# Patient Record
Sex: FEMALE | Race: BLACK OR AFRICAN AMERICAN | URBAN - METROPOLITAN AREA
[De-identification: names, ages, dates, MRNs, and addresses within clinical notes are randomized per-mention and may not be internally consistent; named-entity substitution may affect disease eponyms.]

---

## 2021-05-05 ENCOUNTER — OFFICE VISIT (OUTPATIENT)
Dept: URBAN - METROPOLITAN AREA CLINIC 25 | Facility: CLINIC | Age: 66
End: 2021-05-05
Payer: COMMERCIAL

## 2021-05-05 ENCOUNTER — WEB ENCOUNTER (OUTPATIENT)
Dept: URBAN - METROPOLITAN AREA CLINIC 25 | Facility: CLINIC | Age: 66
End: 2021-05-05

## 2021-05-05 DIAGNOSIS — R19.7 DIARRHEA: ICD-10-CM

## 2021-05-05 DIAGNOSIS — Z86.010 PERSONAL HISTORY OF COLON POLYPS: ICD-10-CM

## 2021-05-05 PROCEDURE — 99203 OFFICE O/P NEW LOW 30 MIN: CPT | Performed by: INTERNAL MEDICINE

## 2021-05-05 RX ORDER — SODIUM, POTASSIUM,MAG SULFATES 17.5-3.13G
177 ML SOLUTION, RECONSTITUTED, ORAL ORAL DAILY
Qty: 177 ML | Refills: 0 | OUTPATIENT
Start: 2021-05-05 | End: 2021-05-06

## 2021-05-05 NOTE — HPI-TODAY'S VISIT:
The patient was referred by Dr. Kanwal Kauffman for colonoscopy .   A copy of this document is being forwarded to the referring provider. Overall the patient has been feeling well.  About 2-3 months ago she lost her appetite.  She then lost about 25 pounds.  She was having post prnadial diarrhea.  She has some cramping prior to the BM.  Her weight has stabilized.  Her dymptoms started after se got her second Moderna COVID vaccine.  She had not been on prior antibiotics or steroids.  Prior to 2-3 months ago she was having normal BM's.  She denies hematochezia, BRBPR, or melena.  The patient denies GERD/N/V/dysphagia.  There is no FHx of colon cancer.  The patient has had prior colonoscopy about 5 years ago.  She did have polyps and was told to repeat it in 5 years.

## 2021-05-10 PROBLEM — 428283002 HISTORY OF POLYP OF COLON: Status: ACTIVE | Noted: 2021-05-05

## 2021-05-12 ENCOUNTER — OFFICE VISIT (OUTPATIENT)
Dept: URBAN - METROPOLITAN AREA CLINIC 25 | Facility: CLINIC | Age: 66
End: 2021-05-12

## 2021-05-14 ENCOUNTER — TELEPHONE ENCOUNTER (OUTPATIENT)
Dept: URBAN - METROPOLITAN AREA CLINIC 92 | Facility: CLINIC | Age: 66
End: 2021-05-14

## 2021-05-14 ENCOUNTER — OFFICE VISIT (OUTPATIENT)
Dept: URBAN - METROPOLITAN AREA SURGERY CENTER 20 | Facility: SURGERY CENTER | Age: 66
End: 2021-05-14
Payer: COMMERCIAL

## 2021-05-14 ENCOUNTER — TELEPHONE ENCOUNTER (OUTPATIENT)
Dept: URBAN - METROPOLITAN AREA CLINIC 84 | Facility: CLINIC | Age: 66
End: 2021-05-14

## 2021-05-14 ENCOUNTER — CLAIMS CREATED FROM THE CLAIM WINDOW (OUTPATIENT)
Dept: URBAN - METROPOLITAN AREA CLINIC 4 | Facility: CLINIC | Age: 66
End: 2021-05-14
Payer: COMMERCIAL

## 2021-05-14 DIAGNOSIS — K63.89 STENOSIS OF ILEOCECAL VALVE: ICD-10-CM

## 2021-05-14 DIAGNOSIS — K63.5 BENIGN COLON POLYP: ICD-10-CM

## 2021-05-14 DIAGNOSIS — Z86.010 H/O ADENOMATOUS POLYP OF COLON: ICD-10-CM

## 2021-05-14 DIAGNOSIS — R19.7 ACUTE DIARRHEA: ICD-10-CM

## 2021-05-14 PROCEDURE — 88342 IMHCHEM/IMCYTCHM 1ST ANTB: CPT | Performed by: PATHOLOGY

## 2021-05-14 PROCEDURE — 88305 TISSUE EXAM BY PATHOLOGIST: CPT | Performed by: PATHOLOGY

## 2021-05-14 PROCEDURE — 88313 SPECIAL STAINS GROUP 2: CPT | Performed by: PATHOLOGY

## 2021-05-14 PROCEDURE — 45380 COLONOSCOPY AND BIOPSY: CPT | Performed by: INTERNAL MEDICINE

## 2021-05-14 PROCEDURE — G8907 PT DOC NO EVENTS ON DISCHARG: HCPCS | Performed by: INTERNAL MEDICINE

## 2021-05-26 ENCOUNTER — OFFICE VISIT (OUTPATIENT)
Dept: URBAN - METROPOLITAN AREA SURGERY CENTER 20 | Facility: SURGERY CENTER | Age: 66
End: 2021-05-26
Payer: COMMERCIAL

## 2021-05-26 ENCOUNTER — CLAIMS CREATED FROM THE CLAIM WINDOW (OUTPATIENT)
Dept: URBAN - METROPOLITAN AREA CLINIC 4 | Facility: CLINIC | Age: 66
End: 2021-05-26
Payer: COMMERCIAL

## 2021-05-26 ENCOUNTER — TELEPHONE ENCOUNTER (OUTPATIENT)
Dept: URBAN - METROPOLITAN AREA CLINIC 92 | Facility: CLINIC | Age: 66
End: 2021-05-26

## 2021-05-26 DIAGNOSIS — K31.84 DIABETIC GASTROPARESIS: ICD-10-CM

## 2021-05-26 DIAGNOSIS — K29.40 CHRONIC ATROPHIC GASTRITIS WITHOUT BLEEDING: ICD-10-CM

## 2021-05-26 DIAGNOSIS — K31.89 SMALL STOMACH SYNDROME: ICD-10-CM

## 2021-05-26 DIAGNOSIS — K31.89 ACQUIRED DEFORMITY OF DUODENUM: ICD-10-CM

## 2021-05-26 DIAGNOSIS — K29.60 ADENOPAPILLOMATOSIS GASTRICA: ICD-10-CM

## 2021-05-26 PROCEDURE — 43239 EGD BIOPSY SINGLE/MULTIPLE: CPT | Performed by: INTERNAL MEDICINE

## 2021-05-26 PROCEDURE — G8907 PT DOC NO EVENTS ON DISCHARG: HCPCS | Performed by: INTERNAL MEDICINE

## 2021-05-26 PROCEDURE — 88342 IMHCHEM/IMCYTCHM 1ST ANTB: CPT | Performed by: PATHOLOGY

## 2021-05-26 PROCEDURE — 88305 TISSUE EXAM BY PATHOLOGIST: CPT | Performed by: PATHOLOGY

## 2021-06-07 ENCOUNTER — LAB OUTSIDE AN ENCOUNTER (OUTPATIENT)
Dept: URBAN - METROPOLITAN AREA CLINIC 84 | Facility: CLINIC | Age: 66
End: 2021-06-07

## 2021-06-07 LAB
CREATININE POC: 1
PERFORMING LAB: (no result)

## 2021-06-30 ENCOUNTER — OFFICE VISIT (OUTPATIENT)
Dept: URBAN - METROPOLITAN AREA CLINIC 25 | Facility: CLINIC | Age: 66
End: 2021-06-30

## 2022-12-06 ENCOUNTER — OUT OF OFFICE VISIT (OUTPATIENT)
Dept: URBAN - METROPOLITAN AREA MEDICAL CENTER 39 | Facility: MEDICAL CENTER | Age: 67
End: 2022-12-06
Payer: COMMERCIAL

## 2022-12-06 DIAGNOSIS — K92.1 ACUTE MELENA: ICD-10-CM

## 2022-12-06 DIAGNOSIS — R93.3 ABN FINDINGS-GI TRACT: ICD-10-CM

## 2022-12-06 DIAGNOSIS — R63.4 ABNORMAL INTENTIONAL WEIGHT LOSS: ICD-10-CM

## 2022-12-06 DIAGNOSIS — I85.10 ESOPH VARICE OTHER DIS: ICD-10-CM

## 2022-12-06 DIAGNOSIS — K31.89 ACQUIRED DEFORMITY OF DUODENUM: ICD-10-CM

## 2022-12-06 DIAGNOSIS — K74.60 ADVANCED CIRRHOSIS: ICD-10-CM

## 2022-12-06 DIAGNOSIS — I85.01 BLEEDING ESOPHAGEAL VARICES: ICD-10-CM

## 2022-12-06 DIAGNOSIS — K92.0 BLOODY EMESIS: ICD-10-CM

## 2022-12-06 DIAGNOSIS — R10.13 ABDOMINAL DISCOMFORT, EPIGASTRIC: ICD-10-CM

## 2022-12-06 PROCEDURE — 99223 1ST HOSP IP/OBS HIGH 75: CPT | Performed by: INTERNAL MEDICINE

## 2022-12-06 PROCEDURE — 99233 SBSQ HOSP IP/OBS HIGH 50: CPT | Performed by: INTERNAL MEDICINE

## 2022-12-06 PROCEDURE — 43244 EGD VARICES LIGATION: CPT | Performed by: INTERNAL MEDICINE

## 2022-12-06 PROCEDURE — G8427 DOCREV CUR MEDS BY ELIG CLIN: HCPCS | Performed by: INTERNAL MEDICINE

## 2022-12-10 ENCOUNTER — P2P PATIENT RECORD (OUTPATIENT)
Age: 67
End: 2022-12-10

## 2022-12-10 ENCOUNTER — OUT OF OFFICE VISIT (OUTPATIENT)
Dept: URBAN - METROPOLITAN AREA MEDICAL CENTER 39 | Facility: MEDICAL CENTER | Age: 67
End: 2022-12-10
Payer: COMMERCIAL

## 2022-12-10 DIAGNOSIS — K92.0 BLOODY EMESIS: ICD-10-CM

## 2022-12-10 DIAGNOSIS — R18.8 ABDOMINAL ASCITES: ICD-10-CM

## 2022-12-10 DIAGNOSIS — K74.60 ADVANCED CIRRHOSIS: ICD-10-CM

## 2022-12-10 PROCEDURE — 99232 SBSQ HOSP IP/OBS MODERATE 35: CPT | Performed by: INTERNAL MEDICINE

## 2022-12-12 ENCOUNTER — TELEPHONE ENCOUNTER (OUTPATIENT)
Dept: URBAN - METROPOLITAN AREA CLINIC 98 | Facility: CLINIC | Age: 67
End: 2022-12-12

## 2022-12-22 ENCOUNTER — OFFICE VISIT (OUTPATIENT)
Dept: URBAN - METROPOLITAN AREA CLINIC 98 | Facility: CLINIC | Age: 67
End: 2022-12-22
Payer: COMMERCIAL

## 2022-12-22 ENCOUNTER — LAB OUTSIDE AN ENCOUNTER (OUTPATIENT)
Dept: URBAN - METROPOLITAN AREA CLINIC 98 | Facility: CLINIC | Age: 67
End: 2022-12-22

## 2022-12-22 VITALS
HEART RATE: 76 BPM | WEIGHT: 179.2 LBS | HEIGHT: 67 IN | DIASTOLIC BLOOD PRESSURE: 94 MMHG | SYSTOLIC BLOOD PRESSURE: 169 MMHG | TEMPERATURE: 97.3 F | BODY MASS INDEX: 28.12 KG/M2

## 2022-12-22 DIAGNOSIS — I85.11 SECONDARY ESOPHAGEAL VARICES WITH BLEEDING: ICD-10-CM

## 2022-12-22 DIAGNOSIS — K74.69 OTHER CIRRHOSIS OF LIVER: ICD-10-CM

## 2022-12-22 PROCEDURE — 99214 OFFICE O/P EST MOD 30 MIN: CPT | Performed by: INTERNAL MEDICINE

## 2022-12-22 RX ORDER — ONDANSETRON 4 MG/1
1-2 TABLET ON THE TONGUE AND ALLOW TO DISSOLVE TABLET, ORALLY DISINTEGRATING ORAL
Qty: 60 | Refills: 1 | OUTPATIENT

## 2022-12-22 NOTE — HPI-TODAY'S VISIT:
Here to follow up , after she was admitted to [facility] in Dec for GI bleed and hematemesis, Hb 8.3  Seen by Dr Joshi at [facility] This is my first visit  w her; Jonna (in person) and Aaliyah (pharmacist on phone) daughters here  . no problems with liver in past.  Has DM.   no FH with liver disease  has tramadol for chronic back pain  woke up this morning - throwing up  - they think had nausea due to taking too much tramadol.  is on pantoprazole but she and her daughters are unable to perform a full medication reconciliation, showing some photos on their phone of prescription bottles, and other medications were reconciled verbally .   - s/p EGD on 12/6 w/ Large EV spurting blood w/ red prabhu signs s/p banding x2 w/ complete eradication, gastritis, and no biopsies done.    - MELD 10 on admission w/ Na 144, INR 1.33, bilirubin 0.4, cr 0.89  - Albumin 3.2 - chronic hep studies negative but pt does not hep B vaccination outpatient w/ PCP    - US para on 12/7 w/ no significant free fluid.

## 2022-12-22 NOTE — HPI-OTHER HISTORIES
EXAM: MR ABDOMEN W AND WO CONTRAST MRCP   CLINICAL INDICATION: cirrhosis?.   TECHNIQUE: Multisequence, multiplanar MRI of the abdomen was performed without and with intravenous contrast. [id number].[id number]   COMPARISON: None   FINDINGS:  Lower Thorax: Clear lung base.   Liver: Hepatic cirrhosis. No suspicious focal hepatic lesion.   Gallbladder/Biliary Tree: Gallbladder not visualized. Mild prominence of intrahepatic bile duct in hepatic dome (20:20).   Spleen: No splenomegaly.   Pancreas: No main ductal dilatation.   Adrenal Glands: No adrenal nodules.   Kidneys/Ureters: No hydronephrosis. Bilateral renal cysts.   Gastrointestinal: Nonobstructive bowel loops.    Lymph Nodes: No suspicious lymph nodes by size criteria.   Vessels: Patent vessels. Mild upper abdominal metastasis.   Peritoneum/Retroperitoneum: Small to moderate volume ascites.   Bones/Soft Tissues: Multilevel degenerative changes in the spine. No suspicious osseous lesion. Unremarkable soft tissues.   IMPRESSION: 1.  Hepatic cirrhosis with manifestations of portal hypertension including small to moderate volume ascites and upper abdominal varices.  2.  No suspicious focal hepatic lesion. Patent hepatic vessels. 3.  Mild prominence of intrahepatic bile duct in the hepatic dome.  12/10/22 11:06 White Blood Cell: 4.1 Red Blood Cell: 3.06 (L) Hemoglobin: 8.9 (L) Hematocrit: 27.5 (L) Mean Corpuscular Volume: 89.9 Mean Corpuscular Hemoglobin: 29.1 Mean Corpuscular Hemoglobin Concentration: 32.4 Platelet: 101 (L)  12/8/22 10:58 Sodium: 146 (H) Potassium: 3.6 Chloride: 118 (H) Carbon Dioxide: 23 ANION GAP: 5 Calculated Osmolality: 297 (H) Blood Urea Nitrogen: 27 (H) Creatinine: 1.10 U:C: 25 (H) Estimated GFR: 52 (L) Glucose: 104 Alkaline Phosphatase: 52 Aspartate Aminotransferase: 20 Alanine Aminotransferase: 16 Calcium Level Total: 7.9 (L) Protein Total: 5.1 (L) Albumin: 2.5 (L) Bilirubin Total: 0.5  . fenofibrate  atorvastatin  nadolol  dodex  freestyle lipre

## 2022-12-23 LAB
A/G RATIO: 1.1
ALBUMIN: 3.3
ALKALINE PHOSPHATASE: 88
ALT (SGPT): 18
AST (SGOT): 35
BASO (ABSOLUTE): 0
BASOS: 1
BILIRUBIN, TOTAL: 0.3
BUN/CREATININE RATIO: 13
BUN: 10
CALCIUM: 8.7
CARBON DIOXIDE, TOTAL: 23
CHLORIDE: 110
CREATININE: 0.77
EGFR: 85
EOS (ABSOLUTE): 0.1
EOS: 2
GLOBULIN, TOTAL: 2.9
GLUCOSE: 102
HEMATOCRIT: 28.8
HEMATOLOGY COMMENTS:: (no result)
HEMOGLOBIN: 9.3
IMMATURE CELLS: (no result)
IMMATURE GRANS (ABS): 0
IMMATURE GRANULOCYTES: 0
INR: 1.1
LYMPHS (ABSOLUTE): 0.6
LYMPHS: 18
MCH: 27.8
MCHC: 32.3
MCV: 86
MONOCYTES(ABSOLUTE): 0.3
MONOCYTES: 10
NEUTROPHILS (ABSOLUTE): 2.5
NEUTROPHILS: 69
NRBC: (no result)
PLATELETS: 137
POTASSIUM: 4.7
PROTEIN, TOTAL: 6.2
PROTHROMBIN TIME: 11.5
RBC: 3.35
RDW: 14.1
SODIUM: 143
WBC: 3.5

## 2022-12-26 ENCOUNTER — TELEPHONE ENCOUNTER (OUTPATIENT)
Dept: URBAN - METROPOLITAN AREA CLINIC 98 | Facility: CLINIC | Age: 67
End: 2022-12-26

## 2022-12-28 ENCOUNTER — TELEPHONE ENCOUNTER (OUTPATIENT)
Dept: URBAN - METROPOLITAN AREA CLINIC 98 | Facility: CLINIC | Age: 67
End: 2022-12-28

## 2023-01-16 PROBLEM — 197476001: Status: ACTIVE | Noted: 2021-05-14

## 2023-01-19 ENCOUNTER — TELEPHONE ENCOUNTER (OUTPATIENT)
Dept: URBAN - METROPOLITAN AREA CLINIC 63 | Facility: CLINIC | Age: 68
End: 2023-01-19

## 2023-02-01 ENCOUNTER — OFFICE VISIT (OUTPATIENT)
Dept: URBAN - METROPOLITAN AREA MEDICAL CENTER 39 | Facility: MEDICAL CENTER | Age: 68
End: 2023-02-01
Payer: COMMERCIAL

## 2023-02-01 DIAGNOSIS — K74.69 CIRRHOSIS, CRYPTOGENIC: ICD-10-CM

## 2023-02-01 DIAGNOSIS — K29.60 ADENOPAPILLOMATOSIS GASTRICA: ICD-10-CM

## 2023-02-01 DIAGNOSIS — I85.10 ESOPH VARICE OTHER DIS: ICD-10-CM

## 2023-02-01 DIAGNOSIS — K31.A15 GASTRIC INTESTINAL METAPLASIA WITHOUT DYSPLASIA, INVOLVING MULTIPLE SITES: ICD-10-CM

## 2023-02-01 PROCEDURE — 43244 EGD VARICES LIGATION: CPT | Performed by: INTERNAL MEDICINE

## 2023-02-01 PROCEDURE — 43239 EGD BIOPSY SINGLE/MULTIPLE: CPT | Performed by: INTERNAL MEDICINE

## 2023-02-01 RX ORDER — ONDANSETRON 4 MG/1
1-2 TABLET ON THE TONGUE AND ALLOW TO DISSOLVE TABLET, ORALLY DISINTEGRATING ORAL
Qty: 60 | Refills: 1 | Status: ACTIVE | COMMUNITY

## 2023-02-08 ENCOUNTER — OFFICE VISIT (OUTPATIENT)
Dept: URBAN - METROPOLITAN AREA SURGERY CENTER 18 | Facility: SURGERY CENTER | Age: 68
End: 2023-02-08

## 2023-02-28 ENCOUNTER — TELEPHONE ENCOUNTER (OUTPATIENT)
Dept: URBAN - METROPOLITAN AREA CLINIC 98 | Facility: CLINIC | Age: 68
End: 2023-02-28

## 2023-04-19 ENCOUNTER — OUT OF OFFICE VISIT (OUTPATIENT)
Dept: URBAN - METROPOLITAN AREA MEDICAL CENTER 39 | Facility: MEDICAL CENTER | Age: 68
End: 2023-04-19
Payer: COMMERCIAL

## 2023-04-19 DIAGNOSIS — K74.69 CIRRHOSIS, CRYPTOGENIC: ICD-10-CM

## 2023-04-19 DIAGNOSIS — R18.8 ABDOMINAL ASCITES: ICD-10-CM

## 2023-04-19 DIAGNOSIS — I85.10 ESOPH VARICE OTHER DIS: ICD-10-CM

## 2023-04-19 DIAGNOSIS — D64.89 ANEMIA DUE TO OTHER CAUSE: ICD-10-CM

## 2023-04-19 PROCEDURE — 99232 SBSQ HOSP IP/OBS MODERATE 35: CPT | Performed by: INTERNAL MEDICINE

## 2023-04-19 PROCEDURE — G8427 DOCREV CUR MEDS BY ELIG CLIN: HCPCS | Performed by: INTERNAL MEDICINE

## 2023-04-19 PROCEDURE — 99222 1ST HOSP IP/OBS MODERATE 55: CPT | Performed by: INTERNAL MEDICINE

## 2023-04-20 ENCOUNTER — OUT OF OFFICE VISIT (OUTPATIENT)
Dept: URBAN - METROPOLITAN AREA MEDICAL CENTER 39 | Facility: MEDICAL CENTER | Age: 68
End: 2023-04-20
Payer: COMMERCIAL

## 2023-04-20 DIAGNOSIS — K31.A11 GASTRIC INTESTINAL METAPLASIA WITHOUT DYSPLASIA, INVOLVING THE ANTRUM: ICD-10-CM

## 2023-04-20 DIAGNOSIS — K74.60 ADVANCED CIRRHOSIS: ICD-10-CM

## 2023-04-20 DIAGNOSIS — K29.60 ADENOPAPILLOMATOSIS GASTRICA: ICD-10-CM

## 2023-04-20 DIAGNOSIS — D50.9 ANEMIA: ICD-10-CM

## 2023-04-20 DIAGNOSIS — I85.10 ESOPH VARICE OTHER DIS: ICD-10-CM

## 2023-04-20 PROCEDURE — 43244 EGD VARICES LIGATION: CPT | Performed by: INTERNAL MEDICINE

## 2023-04-20 PROCEDURE — 43239 EGD BIOPSY SINGLE/MULTIPLE: CPT | Performed by: INTERNAL MEDICINE

## 2023-04-25 ENCOUNTER — TELEPHONE ENCOUNTER (OUTPATIENT)
Dept: URBAN - METROPOLITAN AREA CLINIC 35 | Facility: CLINIC | Age: 68
End: 2023-04-25

## 2023-04-28 ENCOUNTER — LAB OUTSIDE AN ENCOUNTER (OUTPATIENT)
Dept: URBAN - METROPOLITAN AREA CLINIC 98 | Facility: CLINIC | Age: 68
End: 2023-04-28

## 2023-05-02 ENCOUNTER — LAB OUTSIDE AN ENCOUNTER (OUTPATIENT)
Dept: URBAN - METROPOLITAN AREA CLINIC 35 | Facility: CLINIC | Age: 68
End: 2023-05-02

## 2023-05-13 ENCOUNTER — TELEPHONE ENCOUNTER (OUTPATIENT)
Dept: URBAN - METROPOLITAN AREA CLINIC 98 | Facility: CLINIC | Age: 68
End: 2023-05-13

## 2023-05-16 ENCOUNTER — OFFICE VISIT (OUTPATIENT)
Dept: URBAN - METROPOLITAN AREA MEDICAL CENTER 39 | Facility: MEDICAL CENTER | Age: 68
End: 2023-05-16

## 2023-06-02 ENCOUNTER — OFFICE VISIT (OUTPATIENT)
Dept: URBAN - METROPOLITAN AREA CLINIC 98 | Facility: CLINIC | Age: 68
End: 2023-06-02

## 2023-06-19 ENCOUNTER — OFFICE VISIT (OUTPATIENT)
Dept: URBAN - METROPOLITAN AREA CLINIC 114 | Facility: CLINIC | Age: 68
End: 2023-06-19
Payer: COMMERCIAL

## 2023-06-19 DIAGNOSIS — R18.8 ASCITES: ICD-10-CM

## 2023-06-19 DIAGNOSIS — K74.69 OTHER CIRRHOSIS OF LIVER: ICD-10-CM

## 2023-06-19 DIAGNOSIS — R16.1 SPLENOMEGALY: ICD-10-CM

## 2023-06-19 DIAGNOSIS — N28.1 RENAL CYST: ICD-10-CM

## 2023-06-19 PROCEDURE — 76700 US EXAM ABDOM COMPLETE: CPT | Performed by: INTERNAL MEDICINE

## 2023-06-25 ENCOUNTER — LAB OUTSIDE AN ENCOUNTER (OUTPATIENT)
Dept: URBAN - METROPOLITAN AREA CLINIC 35 | Facility: CLINIC | Age: 68
End: 2023-06-25

## 2023-06-26 ENCOUNTER — LAB OUTSIDE AN ENCOUNTER (OUTPATIENT)
Dept: URBAN - METROPOLITAN AREA CLINIC 98 | Facility: CLINIC | Age: 68
End: 2023-06-26

## 2023-06-26 ENCOUNTER — OFFICE VISIT (OUTPATIENT)
Dept: URBAN - METROPOLITAN AREA CLINIC 98 | Facility: CLINIC | Age: 68
End: 2023-06-26
Payer: COMMERCIAL

## 2023-06-26 VITALS
TEMPERATURE: 97.5 F | HEART RATE: 83 BPM | DIASTOLIC BLOOD PRESSURE: 90 MMHG | SYSTOLIC BLOOD PRESSURE: 165 MMHG | BODY MASS INDEX: 26.37 KG/M2 | WEIGHT: 168 LBS | HEIGHT: 67 IN

## 2023-06-26 DIAGNOSIS — I85.11 SECONDARY ESOPHAGEAL VARICES WITH BLEEDING: ICD-10-CM

## 2023-06-26 DIAGNOSIS — R18.8 OTHER ASCITES: ICD-10-CM

## 2023-06-26 DIAGNOSIS — K74.69 OTHER CIRRHOSIS OF LIVER: ICD-10-CM

## 2023-06-26 PROBLEM — 389026000: Status: ACTIVE | Noted: 2023-06-26

## 2023-06-26 PROBLEM — 19943007: Status: ACTIVE | Noted: 2022-12-22

## 2023-06-26 PROBLEM — 17709002: Status: ACTIVE | Noted: 2022-12-25

## 2023-06-26 PROCEDURE — 99214 OFFICE O/P EST MOD 30 MIN: CPT | Performed by: INTERNAL MEDICINE

## 2023-06-26 RX ORDER — ONDANSETRON 4 MG/1
1-2 TABLET ON THE TONGUE AND ALLOW TO DISSOLVE TABLET, ORALLY DISINTEGRATING ORAL
Qty: 60 | Refills: 1 | OUTPATIENT

## 2023-06-26 RX ORDER — ONDANSETRON 4 MG/1
1-2 TABLET ON THE TONGUE AND ALLOW TO DISSOLVE TABLET, ORALLY DISINTEGRATING ORAL
Qty: 60 | Refills: 1 | Status: ACTIVE | COMMUNITY

## 2023-06-27 ENCOUNTER — TELEPHONE ENCOUNTER (OUTPATIENT)
Dept: URBAN - METROPOLITAN AREA CLINIC 95 | Facility: CLINIC | Age: 68
End: 2023-06-27

## 2023-06-27 PROBLEM — 724556004: Status: ACTIVE | Noted: 2023-06-27

## 2023-06-27 LAB
A/G RATIO: 0.9
AFP, SERUM, TUMOR MARKER: <1.8
ALBUMIN: 3.4
ALKALINE PHOSPHATASE: 96
ALT (SGPT): 20
AST (SGOT): 30
BASO (ABSOLUTE): 0
BASOS: 0
BILIRUBIN, TOTAL: 0.2
BUN/CREATININE RATIO: 18
BUN: 17
CALCIUM: 8.9
CARBON DIOXIDE, TOTAL: 24
CHLORIDE: 109
CREATININE: 0.94
EGFR: 67
EOS (ABSOLUTE): 0.1
EOS: 4
FERRITIN, SERUM: 13
GLOBULIN, TOTAL: 3.6
GLUCOSE: 79
HEMATOCRIT: 24.8
HEMATOLOGY COMMENTS:: (no result)
HEMOGLOBIN: 7
IMMATURE CELLS: (no result)
IMMATURE GRANS (ABS): (no result)
IMMATURE GRANULOCYTES: (no result)
INR: 1.2
IRON BIND.CAP.(TIBC): 376
IRON SATURATION: 6
IRON: 22
LYMPHS (ABSOLUTE): 0.5
LYMPHS: 23
MCH: 21.1
MCHC: 28.2
MCV: 75
MONOCYTES(ABSOLUTE): 0.2
MONOCYTES: 10
NEUTROPHILS (ABSOLUTE): 1.4
NEUTROPHILS: 63
NRBC: (no result)
PLATELETS: 70
POTASSIUM: 4.4
PROTEIN, TOTAL: 7
PROTHROMBIN TIME: 11.9
RBC: 3.31
RDW: 20.7
SODIUM: 140
UIBC: 354
WBC: 2.3

## 2023-06-27 RX ORDER — IRON SUCROSE 20 MG/ML
AS DIRECTED INJECTION, SOLUTION INTRAVENOUS
Qty: 5 | Refills: 0 | OUTPATIENT
Start: 2023-06-27 | End: 2023-07-11

## 2023-06-28 ENCOUNTER — TELEPHONE ENCOUNTER (OUTPATIENT)
Dept: URBAN - METROPOLITAN AREA CLINIC 6 | Facility: CLINIC | Age: 68
End: 2023-06-28

## 2023-06-28 ENCOUNTER — TELEPHONE ENCOUNTER (OUTPATIENT)
Dept: URBAN - METROPOLITAN AREA CLINIC 23 | Facility: CLINIC | Age: 68
End: 2023-06-28

## 2023-07-13 ENCOUNTER — TELEPHONE ENCOUNTER (OUTPATIENT)
Dept: URBAN - METROPOLITAN AREA CLINIC 98 | Facility: CLINIC | Age: 68
End: 2023-07-13

## 2023-07-18 ENCOUNTER — OFFICE VISIT (OUTPATIENT)
Dept: URBAN - METROPOLITAN AREA MEDICAL CENTER 39 | Facility: MEDICAL CENTER | Age: 68
End: 2023-07-18
Payer: COMMERCIAL

## 2023-07-18 DIAGNOSIS — I85.10 ESOPH VARICE OTHER DIS: ICD-10-CM

## 2023-07-18 DIAGNOSIS — K29.60 ADENOPAPILLOMATOSIS GASTRICA: ICD-10-CM

## 2023-07-18 DIAGNOSIS — K74.69 CIRRHOSIS, CRYPTOGENIC: ICD-10-CM

## 2023-07-18 PROCEDURE — 43244 EGD VARICES LIGATION: CPT | Performed by: INTERNAL MEDICINE

## 2023-07-18 RX ORDER — ONDANSETRON 4 MG/1
1-2 TABLET ON THE TONGUE AND ALLOW TO DISSOLVE TABLET, ORALLY DISINTEGRATING ORAL
Qty: 60 | Refills: 1 | Status: ACTIVE | COMMUNITY

## 2023-07-25 ENCOUNTER — LAB OUTSIDE AN ENCOUNTER (OUTPATIENT)
Dept: URBAN - METROPOLITAN AREA CLINIC 35 | Facility: CLINIC | Age: 68
End: 2023-07-25

## 2023-12-28 ENCOUNTER — OFFICE VISIT (OUTPATIENT)
Dept: URBAN - METROPOLITAN AREA CLINIC 98 | Facility: CLINIC | Age: 68
End: 2023-12-28

## 2024-03-30 ENCOUNTER — LAB (OUTPATIENT)
Dept: URBAN - METROPOLITAN AREA MEDICAL CENTER 39 | Facility: MEDICAL CENTER | Age: 69
End: 2024-03-30

## 2024-03-30 PROCEDURE — 43235 EGD DIAGNOSTIC BRUSH WASH: CPT | Performed by: INTERNAL MEDICINE

## 2024-04-01 ENCOUNTER — LAB (OUTPATIENT)
Dept: URBAN - METROPOLITAN AREA MEDICAL CENTER 39 | Facility: MEDICAL CENTER | Age: 69
End: 2024-04-01
Payer: COMMERCIAL

## 2024-04-01 DIAGNOSIS — I85.10 ESOPHAGEAL VARICES: ICD-10-CM

## 2024-04-01 DIAGNOSIS — D64.89 NORMOCYTIC ANEMIA: ICD-10-CM

## 2024-04-01 DIAGNOSIS — K74.60 CIRRHOSIS: ICD-10-CM

## 2024-04-01 DIAGNOSIS — K62.5 BRIGHT RED BLOOD PER RECTUM: ICD-10-CM

## 2024-04-01 PROCEDURE — 99232 SBSQ HOSP IP/OBS MODERATE 35: CPT | Performed by: INTERNAL MEDICINE

## 2024-04-17 ENCOUNTER — LAB (OUTPATIENT)
Dept: URBAN - METROPOLITAN AREA CLINIC 98 | Facility: CLINIC | Age: 69
End: 2024-04-17

## 2024-04-17 ENCOUNTER — OV HOSPF/U (OUTPATIENT)
Dept: URBAN - METROPOLITAN AREA CLINIC 98 | Facility: CLINIC | Age: 69
End: 2024-04-17
Payer: COMMERCIAL

## 2024-04-17 VITALS
SYSTOLIC BLOOD PRESSURE: 126 MMHG | DIASTOLIC BLOOD PRESSURE: 81 MMHG | HEIGHT: 67 IN | WEIGHT: 166 LBS | HEART RATE: 64 BPM | TEMPERATURE: 97.5 F | BODY MASS INDEX: 26.06 KG/M2

## 2024-04-17 DIAGNOSIS — D50.9 IRON DEFICIENCY ANEMIA, UNSPECIFIED IRON DEFICIENCY ANEMIA TYPE: ICD-10-CM

## 2024-04-17 DIAGNOSIS — K74.69 OTHER CIRRHOSIS OF LIVER: ICD-10-CM

## 2024-04-17 DIAGNOSIS — K29.51 CHRONIC GASTRITIS WITH BLEEDING, UNSPECIFIED GASTRITIS TYPE: ICD-10-CM

## 2024-04-17 DIAGNOSIS — I85.11 SECONDARY ESOPHAGEAL VARICES WITH BLEEDING: ICD-10-CM

## 2024-04-17 DIAGNOSIS — K76.82 HEPATIC ENCEPHALOPATHY: ICD-10-CM

## 2024-04-17 PROBLEM — 87522002: Status: ACTIVE | Noted: 2024-04-17

## 2024-04-17 PROBLEM — 8493009: Status: ACTIVE | Noted: 2024-04-17

## 2024-04-17 PROCEDURE — 99214 OFFICE O/P EST MOD 30 MIN: CPT

## 2024-04-17 RX ORDER — LACTULOSE 10 G/15ML
15 ML AS NEEDED SOLUTION ORAL TWICE A DAY
Qty: 900 ML | Refills: 5 | OUTPATIENT
Start: 2024-04-17 | End: 2024-08-21

## 2024-04-17 RX ORDER — PANTOPRAZOLE SODIUM 40 MG/1
1 TABLET TABLET, DELAYED RELEASE ORAL ONCE A DAY
Qty: 30 | Refills: 1 | OUTPATIENT
Start: 2024-04-17

## 2024-04-17 RX ORDER — ONDANSETRON 4 MG/1
1-2 TABLET ON THE TONGUE AND ALLOW TO DISSOLVE TABLET, ORALLY DISINTEGRATING ORAL
Qty: 60 | Refills: 1 | Status: ON HOLD | COMMUNITY

## 2024-04-17 RX ORDER — ONDANSETRON 4 MG/1
1-2 TABLET ON THE TONGUE AND ALLOW TO DISSOLVE TABLET, ORALLY DISINTEGRATING ORAL
Qty: 60 | Refills: 1 | OUTPATIENT

## 2024-04-17 RX ORDER — RIFAXIMIN 550 MG/1
1 TABLET TABLET ORAL TWICE A DAY
Qty: 60 | Refills: 5 | OUTPATIENT
Start: 2024-04-17 | End: 2024-10-14

## 2024-04-17 NOTE — HPI-TODAY'S VISIT:
Visit 6/26/23- Dr. Ocampo here to follow up  on back pain : going to get injections no abd pain or swelling  no bleeding  Visit 4/17/24- Verona Espinoza NP - Here to follow-up after hospitalization for acute GI bleed - \[facility]\"" 3/29-4/2- admitted for anemia HgB 5 and fatigue  - Received 3 units PRBC - PMH: HAYS cirrhosis, esophageal varices, pancytopenia, hypomagnesemia - EGD 3/30- findings of gastritis - Colonoscopy 3/31- findings few polyps no active bleed - Labs 4/16/24- WBC 2.5, RBC 3.66, HgB 8.4, MCV 81.4, PLT 83, ferritin 9, - Labs 3/29/24- ast 22, alt 12, tbil 0.3, na 140, k 3.5, cr 0.84 - Since discharge still having a lot of fatigue with lightheadedness - Eats ice constantly - Taking lactulose bid, xifaxan bid,  - Stopped ASA

## 2024-04-17 NOTE — HPI-OTHER HISTORIES
3/29/24 ABDOMINAL ULTRASOUND Hx of cirrhosis. TECHNIQUE: Axial and longitudinal images were obtained of the right upper abdomen using real-time ultrasound. ESRC.3.7.2 COMPARISON: MRI 12/6/2022 FINDINGS:  Liver: Coarsened echotexture compatible with chronic hepatic parenchymal disease. No demonstrated focal suspicious lesion. Portal vein is patent. Hepatic veins are patent. Bile Ducts: No dilated intrahepatic biliary radicles. Common bile duct measures 5 mm. Gallbladder: Cholecystectomy Pancreas: Visualized portions within expected limits. Right Kidney: Measures 9.4 cm. Normal echogenicity. No hydronephrosis. Suboptimally assessed right renal upper pole exophytic cyst seen to best effect by prior MRI. Other renal lesions seen on prior MRI are poorly visualized. Other: Minimal ascites and possible right pleural effusion.  IMPRESSION:Patent main portal vein.Changes in keeping with known hepatic cirrhosis.No demonstrated suspicious lesion. Recommend outpatient contrast MRI for HCC surveillance is prior outside imaging and clinical situation direct.Trace ascites and possible right pleural effusion. COLONOSCOPY 3/31/24 Findings:     The perianal and digital rectal examinations were normal.     The terminal ileum appeared normal.     Multiple small and large-mouthed diverticula were found in the sigmoid      colon, descending colon, transverse colon and hepatic flexure.     Two sessile polyps were found in the splenic flexure and hepatic      flexure. The polyps were 2 to 3 mm in size. These polyps were removed      with a cold biopsy forceps. Resection and retrieval were complete.      Estimated blood loss was minimal.     A 5 mm polyp was found in the sigmoid colon. The polyp was sessile. The      polyp was removed with a cold snare. Resection and retrieval were      complete. Estimated blood loss was minimal.     The exam was otherwise without abnormality.     Non-bleeding internal hemorrhoids were found during retroflexion. The      hemorrhoids were mild. Impression:            - The examined portion of the ileum was normal.                       - Diverticulosis in the sigmoid colon, in the                        descending colon, in the transverse colon and at the                        hepatic flexure.                       - Two 2 to 3 mm polyps at the splenic flexure and at                        the hepatic flexure, removed with a cold biopsy                        forceps. Resected and retrieved.                       - One 5 mm polyp in the sigmoid colon, removed with a                        cold snare. Resected and retrieved.                       - The examination was otherwise normal.                       - Non-bleeding internal hemorrhoids. Pathology: tubular adenomas polyps  EGD 3/30/24 Findings:     Grade I, small (< 5 mm) varices were found in the lower third of the      esophagus. They were small in size. No stigmata of bleeding noted.     A small post variceal banding scar was found in the middle third of the      esophagus and in the lower third of the esophagus. The scar tissue was      healthy in appearance.     A small amount of food (residue) was found in the gastric body.     Localized moderate inflammation characterized by nodularity was found in      the gastric antrum. Biopsies were taken with a cold forceps for      histology. Estimated blood loss was minimal.     The examined duodenum was normal. Impression:            - Grade I and small (< 5 mm) esophageal varices.                       - Scar in the middle third of the esophagus and in the                        lower third of the esophagus.                       - A small amount of food (residue) in the stomach.                       - Acute gastritis. Biopsied.                       - Normal examined duodenum. Pathology: acute gastritis

## 2024-04-18 LAB
A/G RATIO: 0.9
AFP, SERUM, TUMOR MARKER: 1.8
ALBUMIN: 3.5
ALKALINE PHOSPHATASE: 82
ALT (SGPT): 18
AST (SGOT): 28
BANDS: (no result)
BASO (ABSOLUTE): 0
BASOS: 1
BILIRUBIN, TOTAL: 0.5
BLASTS/BLAST LIKE CELLS: (no result)
BUN/CREATININE RATIO: 15
BUN: 11
CALCIUM: 8.7
CARBON DIOXIDE, TOTAL: 20
CHLORIDE: 112
CREATININE: 0.75
EGFR: 87
EOS (ABSOLUTE): 0
EOS: 1
FERRITIN, SERUM: 14
GGT: 22
GLOBULIN, TOTAL: 4.1
GLUCOSE: 81
HEMATOCRIT: 27.5
HEMATOLOGY COMMENTS:: (no result)
HEMOGLOBIN: 8.2
IMMATURE CELLS: (no result)
IMMATURE GRANS (ABS): (no result)
IMMATURE GRANULOCYTES: (no result)
INR: 1.2
IRON BIND.CAP.(TIBC): 342
IRON SATURATION: 10
IRON: 34
LYMPHS (ABSOLUTE): 0.5
LYMPHS: 23
MAGNESIUM: 1.7
MCH: 22.6
MCHC: 29.8
MCV: 76
MEGAKARYOCYTES: (no result)
METAMYELOCYTES: (no result)
MONOCYTES(ABSOLUTE): 0.1
MONOCYTES: 3
MYELOCYTES: 1
NEUTROPHILS (ABSOLUTE): 1.6
NEUTROPHILS: 71
NRBC: (no result)
OTHER, LINEAGE UNCERTAIN: (no result)
PLATELETS: 69
POTASSIUM: 3.6
PROMYELOCYTES: (no result)
PROTEIN, TOTAL: 7.6
PROTHROMBIN TIME: 12.6
RBC: 3.63
RDW: 20.1
SODIUM: 143
UIBC: 308
WBC: 2.2

## 2024-04-24 ENCOUNTER — PILLCAM (OUTPATIENT)
Dept: URBAN - METROPOLITAN AREA CLINIC 97 | Facility: CLINIC | Age: 69
End: 2024-04-24

## 2024-04-25 ENCOUNTER — PILLCAM (OUTPATIENT)
Dept: URBAN - METROPOLITAN AREA CLINIC 97 | Facility: CLINIC | Age: 69
End: 2024-04-25

## 2024-04-30 ENCOUNTER — LAB (OUTPATIENT)
Dept: URBAN - METROPOLITAN AREA CLINIC 98 | Facility: CLINIC | Age: 69
End: 2024-04-30

## 2024-05-01 ENCOUNTER — TELEPHONE ENCOUNTER (OUTPATIENT)
Dept: URBAN - METROPOLITAN AREA CLINIC 98 | Facility: CLINIC | Age: 69
End: 2024-05-01

## 2024-05-02 ENCOUNTER — OFFICE VISIT (OUTPATIENT)
Dept: URBAN - METROPOLITAN AREA MEDICAL CENTER 39 | Facility: MEDICAL CENTER | Age: 69
End: 2024-05-02
Payer: COMMERCIAL

## 2024-05-02 DIAGNOSIS — I85.10 ESOPH VARICE OTHER DIS: ICD-10-CM

## 2024-05-02 DIAGNOSIS — K74.69 CIRRHOSIS, CRYPTOGENIC: ICD-10-CM

## 2024-05-02 DIAGNOSIS — K31.811 ACQUIRED ARTERIOVENOUS MALFORMATION OF DUODENUM WITH HEMORRHAGE: ICD-10-CM

## 2024-05-02 PROCEDURE — 43244 EGD VARICES LIGATION: CPT | Performed by: INTERNAL MEDICINE

## 2024-05-02 PROCEDURE — 44366 SMALL BOWEL ENDOSCOPY: CPT | Performed by: INTERNAL MEDICINE

## 2024-05-02 RX ORDER — RIFAXIMIN 550 MG/1
1 TABLET TABLET ORAL TWICE A DAY
Qty: 180 TABLET | Refills: 3 | Status: ACTIVE | COMMUNITY
Start: 2024-04-17 | End: 2025-04-19

## 2024-05-02 RX ORDER — PANTOPRAZOLE SODIUM 40 MG/1
1 TABLET TABLET, DELAYED RELEASE ORAL ONCE A DAY
Qty: 30 | Refills: 1 | Status: ACTIVE | COMMUNITY
Start: 2024-04-17

## 2024-05-02 RX ORDER — ONDANSETRON 4 MG/1
1-2 TABLET ON THE TONGUE AND ALLOW TO DISSOLVE TABLET, ORALLY DISINTEGRATING ORAL
Qty: 60 | Refills: 1 | Status: ACTIVE | COMMUNITY

## 2024-05-02 RX ORDER — LACTULOSE 10 G/15ML
15 ML AS NEEDED SOLUTION ORAL TWICE A DAY
Qty: 900 ML | Refills: 5 | Status: ACTIVE | COMMUNITY
Start: 2024-04-17 | End: 2024-08-21

## 2024-05-21 ENCOUNTER — OFFICE VISIT (OUTPATIENT)
Dept: URBAN - METROPOLITAN AREA CLINIC 98 | Facility: CLINIC | Age: 69
End: 2024-05-21

## 2024-05-21 ENCOUNTER — DASHBOARD ENCOUNTERS (OUTPATIENT)
Age: 69
End: 2024-05-21

## 2024-05-21 VITALS
WEIGHT: 159.8 LBS | HEIGHT: 67 IN | DIASTOLIC BLOOD PRESSURE: 81 MMHG | BODY MASS INDEX: 25.08 KG/M2 | SYSTOLIC BLOOD PRESSURE: 129 MMHG | HEART RATE: 68 BPM | TEMPERATURE: 97 F

## 2024-05-21 RX ORDER — ONDANSETRON 4 MG/1
1-2 TABLET ON THE TONGUE AND ALLOW TO DISSOLVE TABLET, ORALLY DISINTEGRATING ORAL
Qty: 60 | Refills: 1 | Status: ACTIVE | COMMUNITY

## 2024-05-21 RX ORDER — PANTOPRAZOLE SODIUM 40 MG/1
1 TABLET TABLET, DELAYED RELEASE ORAL ONCE A DAY
Qty: 30 | Refills: 1 | OUTPATIENT

## 2024-05-21 RX ORDER — PANTOPRAZOLE SODIUM 40 MG/1
1 TABLET TABLET, DELAYED RELEASE ORAL ONCE A DAY
Qty: 30 | Refills: 1 | Status: ACTIVE | COMMUNITY
Start: 2024-04-17

## 2024-05-21 RX ORDER — LACTULOSE 10 G/15ML
15 ML AS NEEDED SOLUTION ORAL TWICE A DAY
Qty: 900 ML | Refills: 5 | Status: ACTIVE | COMMUNITY
Start: 2024-04-17 | End: 2024-08-21

## 2024-05-21 RX ORDER — RIFAXIMIN 550 MG/1
1 TABLET TABLET ORAL TWICE A DAY
Qty: 180 TABLET | Refills: 3 | Status: ACTIVE | COMMUNITY
Start: 2024-04-17 | End: 2025-04-19

## 2024-05-21 RX ORDER — RIFAXIMIN 550 MG/1
1 TABLET TABLET ORAL TWICE A DAY
Qty: 60 | Refills: 5 | OUTPATIENT

## 2024-05-21 RX ORDER — LACTULOSE 10 G/15ML
15 ML AS NEEDED SOLUTION ORAL TWICE A DAY
Qty: 900 ML | Refills: 5 | OUTPATIENT

## 2024-05-22 ENCOUNTER — TELEPHONE ENCOUNTER (OUTPATIENT)
Dept: URBAN - METROPOLITAN AREA CLINIC 98 | Facility: CLINIC | Age: 69
End: 2024-05-22

## 2024-05-22 LAB
A/G RATIO: 0.9
ALBUMIN: 3.5
ALKALINE PHOSPHATASE: 81
ALT (SGPT): 15
AST (SGOT): 24
BASO (ABSOLUTE): 0
BASOS: 1
BILIRUBIN, TOTAL: 0.4
BUN/CREATININE RATIO: 20
BUN: 13
CALCIUM: 8.9
CARBON DIOXIDE, TOTAL: 23
CHLORIDE: 109
CREATININE: 0.66
EGFR: 95
EOS (ABSOLUTE): 0.1
EOS: 3
FERRITIN, SERUM: 22
GLOBULIN, TOTAL: 3.9
GLUCOSE: 90
HEMATOCRIT: 25.6
HEMATOLOGY COMMENTS:: (no result)
HEMOGLOBIN: 7.6
IMMATURE CELLS: (no result)
IMMATURE GRANS (ABS): 0
IMMATURE GRANULOCYTES: 0
INR: 1.1
IRON BIND.CAP.(TIBC): 322
IRON SATURATION: 7
IRON: 22
LIPASE: 60
LYMPHS (ABSOLUTE): 0.7
LYMPHS: 28
MAGNESIUM: 1.8
MCH: 22.5
MCHC: 29.7
MCV: 76
MONOCYTES(ABSOLUTE): 0.2
MONOCYTES: 9
NEUTROPHILS (ABSOLUTE): 1.4
NEUTROPHILS: 59
NRBC: (no result)
PLATELETS: 68
POTASSIUM: 3.8
PROTEIN, TOTAL: 7.4
PROTHROMBIN TIME: 11.7
RBC: 3.38
RDW: 19.9
SODIUM: 141
UIBC: 300
WBC: 2.4

## 2024-05-30 ENCOUNTER — TELEPHONE ENCOUNTER (OUTPATIENT)
Dept: URBAN - METROPOLITAN AREA CLINIC 98 | Facility: CLINIC | Age: 69
End: 2024-05-30

## 2024-06-10 ENCOUNTER — OFFICE VISIT (OUTPATIENT)
Dept: URBAN - METROPOLITAN AREA CLINIC 98 | Facility: CLINIC | Age: 69
End: 2024-06-10

## 2024-06-11 ENCOUNTER — TELEPHONE ENCOUNTER (OUTPATIENT)
Dept: URBAN - METROPOLITAN AREA CLINIC 98 | Facility: CLINIC | Age: 69
End: 2024-06-11

## 2024-06-12 ENCOUNTER — LAB OUTSIDE AN ENCOUNTER (OUTPATIENT)
Dept: URBAN - METROPOLITAN AREA CLINIC 98 | Facility: CLINIC | Age: 69
End: 2024-06-12

## 2024-06-14 ENCOUNTER — TELEPHONE ENCOUNTER (OUTPATIENT)
Dept: URBAN - METROPOLITAN AREA CLINIC 98 | Facility: CLINIC | Age: 69
End: 2024-06-14

## 2024-06-18 ENCOUNTER — TELEPHONE ENCOUNTER (OUTPATIENT)
Dept: URBAN - METROPOLITAN AREA CLINIC 98 | Facility: CLINIC | Age: 69
End: 2024-06-18

## 2024-06-18 ENCOUNTER — OFFICE VISIT (OUTPATIENT)
Dept: URBAN - METROPOLITAN AREA CLINIC 98 | Facility: CLINIC | Age: 69
End: 2024-06-18

## 2024-06-20 ENCOUNTER — OFFICE VISIT (OUTPATIENT)
Dept: URBAN - METROPOLITAN AREA MEDICAL CENTER 28 | Facility: MEDICAL CENTER | Age: 69
End: 2024-06-20
Payer: COMMERCIAL

## 2024-06-20 ENCOUNTER — LAB OUTSIDE AN ENCOUNTER (OUTPATIENT)
Dept: URBAN - METROPOLITAN AREA CLINIC 98 | Facility: CLINIC | Age: 69
End: 2024-06-20

## 2024-06-20 ENCOUNTER — TELEPHONE ENCOUNTER (OUTPATIENT)
Dept: URBAN - METROPOLITAN AREA CLINIC 98 | Facility: CLINIC | Age: 69
End: 2024-06-20

## 2024-06-20 DIAGNOSIS — I85.10 ESOPHAGEAL VARICES: ICD-10-CM

## 2024-06-20 DIAGNOSIS — K74.60 CIRRHOSIS: ICD-10-CM

## 2024-06-20 DIAGNOSIS — K31.811 ANGIODYSPLASIA OF STOMACH AND DUODENUM WITH BLEEDING: ICD-10-CM

## 2024-06-20 LAB
GLUCOSE POC: 99
PERFORMING LAB: (no result)

## 2024-06-20 PROCEDURE — 43255 EGD CONTROL BLEEDING ANY: CPT | Performed by: INTERNAL MEDICINE

## 2024-06-20 PROCEDURE — 43244 EGD VARICES LIGATION: CPT | Performed by: INTERNAL MEDICINE

## 2024-06-20 RX ORDER — RIFAXIMIN 550 MG/1
1 TABLET TABLET ORAL TWICE A DAY
Qty: 60 | Refills: 5 | Status: ACTIVE | COMMUNITY

## 2024-06-20 RX ORDER — PANTOPRAZOLE SODIUM 40 MG/1
1 TABLET TABLET, DELAYED RELEASE ORAL ONCE A DAY
Qty: 30 | Refills: 1 | Status: ACTIVE | COMMUNITY

## 2024-06-20 RX ORDER — LACTULOSE 10 G/15ML
15 ML AS NEEDED SOLUTION ORAL TWICE A DAY
Qty: 900 ML | Refills: 5 | Status: ACTIVE | COMMUNITY

## 2024-06-20 RX ORDER — ONDANSETRON 4 MG/1
1-2 TABLET ON THE TONGUE AND ALLOW TO DISSOLVE TABLET, ORALLY DISINTEGRATING ORAL
Qty: 60 | Refills: 1 | Status: ACTIVE | COMMUNITY

## 2024-06-26 ENCOUNTER — OFFICE VISIT (OUTPATIENT)
Dept: URBAN - METROPOLITAN AREA CLINIC 98 | Facility: CLINIC | Age: 69
End: 2024-06-26

## 2024-08-05 ENCOUNTER — LAB OUTSIDE AN ENCOUNTER (OUTPATIENT)
Dept: URBAN - METROPOLITAN AREA CLINIC 98 | Facility: CLINIC | Age: 69
End: 2024-08-05

## 2024-08-26 ENCOUNTER — LAB OUTSIDE AN ENCOUNTER (OUTPATIENT)
Dept: URBAN - METROPOLITAN AREA CLINIC 98 | Facility: CLINIC | Age: 69
End: 2024-08-26

## 2024-08-26 ENCOUNTER — OFFICE VISIT (OUTPATIENT)
Dept: URBAN - METROPOLITAN AREA CLINIC 98 | Facility: CLINIC | Age: 69
End: 2024-08-26
Payer: COMMERCIAL

## 2024-08-26 VITALS
BODY MASS INDEX: 25.83 KG/M2 | DIASTOLIC BLOOD PRESSURE: 80 MMHG | SYSTOLIC BLOOD PRESSURE: 144 MMHG | WEIGHT: 164.6 LBS | TEMPERATURE: 97.1 F | HEART RATE: 65 BPM | HEIGHT: 67 IN

## 2024-08-26 DIAGNOSIS — K74.69 OTHER CIRRHOSIS OF LIVER: ICD-10-CM

## 2024-08-26 DIAGNOSIS — I85.11 SECONDARY ESOPHAGEAL VARICES WITH BLEEDING: ICD-10-CM

## 2024-08-26 DIAGNOSIS — D50.8 ACHLORHYDRIC ANEMIA: ICD-10-CM

## 2024-08-26 DIAGNOSIS — K76.82 HEPATIC ENCEPHALOPATHY: ICD-10-CM

## 2024-08-26 PROCEDURE — 99214 OFFICE O/P EST MOD 30 MIN: CPT | Performed by: INTERNAL MEDICINE

## 2024-08-26 RX ORDER — PANTOPRAZOLE SODIUM 40 MG/1
1 TABLET TABLET, DELAYED RELEASE ORAL ONCE A DAY
Qty: 30 | Refills: 1 | Status: ACTIVE | COMMUNITY

## 2024-08-26 RX ORDER — ONDANSETRON 4 MG/1
1-2 TABLET ON THE TONGUE AND ALLOW TO DISSOLVE TABLET, ORALLY DISINTEGRATING ORAL
Qty: 60 | Refills: 1 | Status: ACTIVE | COMMUNITY

## 2024-08-26 RX ORDER — RIFAXIMIN 550 MG/1
1 TABLET TABLET ORAL TWICE A DAY
Qty: 60 | Refills: 5 | Status: ACTIVE | COMMUNITY

## 2024-08-26 RX ORDER — LACTULOSE 10 G/15ML
15 ML AS NEEDED SOLUTION ORAL TWICE A DAY
Qty: 900 ML | Refills: 5 | Status: ACTIVE | COMMUNITY

## 2024-08-27 LAB
AFP, SERUM, TUMOR MARKER: 1.8
ALBUMIN: 3.6
ALKALINE PHOSPHATASE: 121
ALT (SGPT): 25
AST (SGOT): 35
BASO (ABSOLUTE): 0
BASOS: 0
BILIRUBIN, TOTAL: 0.5
BUN/CREATININE RATIO: 15
BUN: 13
CALCIUM: 8.6
CARBON DIOXIDE, TOTAL: 21
CHLORIDE: 111
CREATININE: 0.89
EGFR: 71
EOS (ABSOLUTE): 0.1
EOS: 3
FERRITIN, SERUM: 243
GLOBULIN, TOTAL: 3.8
GLUCOSE: 96
HEMATOCRIT: 34.3
HEMATOLOGY COMMENTS:: (no result)
HEMOGLOBIN: 10.9
IMMATURE CELLS: (no result)
IMMATURE GRANS (ABS): 0
IMMATURE GRANULOCYTES: 0
INR: 1.2
IRON BIND.CAP.(TIBC): 206
IRON SATURATION: 32
IRON: 66
LYMPHS (ABSOLUTE): 0.7
LYMPHS: 25
MCH: 28.3
MCHC: 31.8
MCV: 89
MONOCYTES(ABSOLUTE): 0.3
MONOCYTES: 10
NEUTROPHILS (ABSOLUTE): 1.7
NEUTROPHILS: 62
NRBC: (no result)
PLATELETS: 54
POTASSIUM: 4
PROTEIN, TOTAL: 7.4
PROTHROMBIN TIME: 12.9
RBC: 3.85
RDW: 17.9
SODIUM: 146
UIBC: 140
WBC: 2.8

## 2024-08-30 ENCOUNTER — TELEPHONE ENCOUNTER (OUTPATIENT)
Dept: URBAN - METROPOLITAN AREA CLINIC 98 | Facility: CLINIC | Age: 69
End: 2024-08-30

## 2024-09-30 ENCOUNTER — LAB OUTSIDE AN ENCOUNTER (OUTPATIENT)
Dept: URBAN - METROPOLITAN AREA CLINIC 98 | Facility: CLINIC | Age: 69
End: 2024-09-30

## 2024-10-02 ENCOUNTER — TELEPHONE ENCOUNTER (OUTPATIENT)
Dept: URBAN - METROPOLITAN AREA CLINIC 98 | Facility: CLINIC | Age: 69
End: 2024-10-02

## 2024-11-04 ENCOUNTER — OFFICE VISIT (OUTPATIENT)
Dept: URBAN - METROPOLITAN AREA CLINIC 98 | Facility: CLINIC | Age: 69
End: 2024-11-04

## 2024-11-21 ENCOUNTER — OFFICE VISIT (OUTPATIENT)
Dept: URBAN - METROPOLITAN AREA CLINIC 98 | Facility: CLINIC | Age: 69
End: 2024-11-21

## 2024-11-30 ENCOUNTER — LAB OUTSIDE AN ENCOUNTER (OUTPATIENT)
Dept: URBAN - METROPOLITAN AREA CLINIC 98 | Facility: CLINIC | Age: 69
End: 2024-11-30

## 2024-12-10 ENCOUNTER — LAB OUTSIDE AN ENCOUNTER (OUTPATIENT)
Dept: URBAN - METROPOLITAN AREA CLINIC 98 | Facility: CLINIC | Age: 69
End: 2024-12-10

## 2024-12-10 ENCOUNTER — OFFICE VISIT (OUTPATIENT)
Dept: URBAN - METROPOLITAN AREA CLINIC 98 | Facility: CLINIC | Age: 69
End: 2024-12-10
Payer: COMMERCIAL

## 2024-12-10 VITALS
HEIGHT: 67 IN | TEMPERATURE: 97.8 F | DIASTOLIC BLOOD PRESSURE: 83 MMHG | HEART RATE: 74 BPM | BODY MASS INDEX: 26.74 KG/M2 | WEIGHT: 170.4 LBS | SYSTOLIC BLOOD PRESSURE: 138 MMHG

## 2024-12-10 DIAGNOSIS — K74.69 OTHER CIRRHOSIS OF LIVER: ICD-10-CM

## 2024-12-10 DIAGNOSIS — I85.11 SECONDARY ESOPHAGEAL VARICES WITH BLEEDING: ICD-10-CM

## 2024-12-10 DIAGNOSIS — D50.9 IRON DEFICIENCY ANEMIA, UNSPECIFIED IRON DEFICIENCY ANEMIA TYPE: ICD-10-CM

## 2024-12-10 DIAGNOSIS — R93.5 ABNORMAL ABDOMINAL MRI: ICD-10-CM

## 2024-12-10 DIAGNOSIS — K76.82 HEPATIC ENCEPHALOPATHY: ICD-10-CM

## 2024-12-10 DIAGNOSIS — K29.51 CHRONIC GASTRITIS WITH BLEEDING, UNSPECIFIED GASTRITIS TYPE: ICD-10-CM

## 2024-12-10 PROCEDURE — 99214 OFFICE O/P EST MOD 30 MIN: CPT

## 2024-12-10 RX ORDER — ONDANSETRON 4 MG/1
1-2 TABLET ON THE TONGUE AND ALLOW TO DISSOLVE TABLET, ORALLY DISINTEGRATING ORAL
Qty: 60 | Refills: 1 | Status: ON HOLD | COMMUNITY

## 2024-12-10 RX ORDER — PANTOPRAZOLE SODIUM 40 MG/1
1 TABLET TABLET, DELAYED RELEASE ORAL ONCE A DAY
Qty: 30 | Refills: 1 | Status: ON HOLD | COMMUNITY

## 2024-12-10 RX ORDER — LACTULOSE 10 G/15ML
15 ML AS NEEDED SOLUTION ORAL TWICE A DAY
Qty: 900 ML | Refills: 5 | Status: ON HOLD | COMMUNITY

## 2024-12-10 RX ORDER — RIFAXIMIN 550 MG/1
1 TABLET TABLET ORAL TWICE A DAY
Qty: 60 | Refills: 5 | Status: ON HOLD | COMMUNITY

## 2024-12-10 NOTE — HPI-TODAY'S VISIT:
Visit 6/26/23- Dr. Ocampo here to follow up  on back pain : going to get injections no abd pain or swelling  no bleeding  Visit 4/17/24- Verona Espinoza NP - Here to follow-up after hospitalization for acute GI bleed - [facility] 3/29-4/2- admitted for anemia HgB 5 and fatigue  - Received 3 units PRBC - PMH: HAYS cirrhosis, esophageal varices, pancytopenia, hypomagnesemia - EGD 3/30- findings of gastritis - Colonoscopy 3/31- findings few polyps no active bleed - Labs 4/16/24- WBC 2.5, RBC 3.66, HgB 8.4, MCV 81.4, PLT 83, ferritin 9, - Labs 3/29/24- ast 22, alt 12, tbil 0.3, na 140, k 3.5, cr 0.84 - Since discharge still having a lot of fatigue with lightheadedness - Eats ice constantly - Taking lactulose bid, xifaxan bid,  - Stopped ASA  Visit 5/21/24- Verona Espinoza NP - Here to follow-up GI bleed - Enteroscopy 5/2/24- Large >5 mm varices banded- 3 bands, gastric antral vascular ectasia with bleeding treated with APC - X-Ray abdomen- no PillCam not noted - Very fatigued had diarrhea for 3 days; none since yesterday - Eating a bland diet - Denies fever - Some vomiting- cannot remember what she ate before episode - Diarrhea stopped yesterday  8/26/24- Dr. Ocampo Feeling ok ; not dizzy or lightheaded not vomiting blood Daughter Aaliyah on phone  12/10/24- Verona Espinoza NP - 70 yo female here to cirrhosis and anemia - PCP Dr. Scottie Alvarez - Since last visit not sleeping well - Has not consulted with hematologist as discussed with Dr. Ocampo - No vomiting - No blood in stool or melena - Still having a lot of lightheadedness and fatigue Reviewed - EGD 6/20/24- esophageal varices with banding - MRI 9/30/24- Hepatic cirrhosis. Stigmata of portal hypertension. No definite evidence for hepatocellular carcinoma.         2.    Geographic signal abnormality in hepatic segments VI/VII and II/III, as described. No definite lesions identified with arterial phase hyperenhancement, washout, or pseudocapsule formation. Findings appear similar compared to the prior study on May 14, 2024. Close attention on follow-up imaging is advised.         3.    Bowel wall thickening and edema in the ascending colon, which may be the sequela of portal hypertension, however, colitis is not excluded.

## 2024-12-10 NOTE — PHYSICAL EXAM GASTROINTESTINAL
Abdomen , soft, nontender, nondistended , no guarding or rigidity , no masses palpable , normal bowel sounds , Liver nontender

## 2024-12-10 NOTE — HPI-OTHER HISTORIES
3/29/24 ABDOMINAL ULTRASOUND Hx of cirrhosis. TECHNIQUE: Axial and longitudinal images were obtained of the right upper abdomen using real-time ultrasound. ESRC.3.7.2 COMPARISON: MRI 12/6/2022 FINDINGS:  Liver: Coarsened echotexture compatible with chronic hepatic parenchymal disease. No demonstrated focal suspicious lesion. Portal vein is patent. Hepatic veins are patent. Bile Ducts: No dilated intrahepatic biliary radicles. Common bile duct measures 5 mm. Gallbladder: Cholecystectomy Pancreas: Visualized portions within expected limits. Right Kidney: Measures 9.4 cm. Normal echogenicity. No hydronephrosis. Suboptimally assessed right renal upper pole exophytic cyst seen to best effect by prior MRI. Other renal lesions seen on prior MRI are poorly visualized. Other: Minimal ascites and possible right pleural effusion.  IMPRESSION:Patent main portal vein.Changes in keeping with known hepatic cirrhosis.No demonstrated suspicious lesion. Recommend outpatient contrast MRI for HCC surveillance is prior outside imaging and clinical situation direct.Trace ascites and possible right pleural effusion. COLONOSCOPY 3/31/24 Findings:     The perianal and digital rectal examinations were normal.     The terminal ileum appeared normal.     Multiple small and large-mouthed diverticula were found in the sigmoid      colon, descending colon, transverse colon and hepatic flexure.     Two sessile polyps were found in the splenic flexure and hepatic      flexure. The polyps were 2 to 3 mm in size. These polyps were removed      with a cold biopsy forceps. Resection and retrieval were complete.      Estimated blood loss was minimal.     A 5 mm polyp was found in the sigmoid colon. The polyp was sessile. The      polyp was removed with a cold snare. Resection and retrieval were      complete. Estimated blood loss was minimal.     The exam was otherwise without abnormality.     Non-bleeding internal hemorrhoids were found during retroflexion. The      hemorrhoids were mild. Impression:            - The examined portion of the ileum was normal.                       - Diverticulosis in the sigmoid colon, in the                        descending colon, in the transverse colon and at the                        hepatic flexure.                       - Two 2 to 3 mm polyps at the splenic flexure and at                        the hepatic flexure, removed with a cold biopsy                        forceps. Resected and retrieved.                       - One 5 mm polyp in the sigmoid colon, removed with a                        cold snare. Resected and retrieved.                       - The examination was otherwise normal.                       - Non-bleeding internal hemorrhoids. Pathology: tubular adenomas polyps EGD 3/30/24 Findings:     Grade I, small (< 5 mm) varices were found in the lower third of the      esophagus. They were small in size. No stigmata of bleeding noted.     A small post variceal banding scar was found in the middle third of the      esophagus and in the lower third of the esophagus. The scar tissue was      healthy in appearance.     A small amount of food (residue) was found in the gastric body.     Localized moderate inflammation characterized by nodularity was found in      the gastric antrum. Biopsies were taken with a cold forceps for      histology. Estimated blood loss was minimal.     The examined duodenum was normal. Impression:            - Grade I and small (< 5 mm) esophageal varices.                       - Scar in the middle third of the esophagus and in the                        lower third of the esophagus.                       - A small amount of food (residue) in the stomach.                       - Acute gastritis. Biopsied.                       - Normal examined duodenum. Pathology: acute gastritis 4/30/24 PILLCAM - Active GI bleed in stomach and proximal small bowel- etiology unclear - Transient retention of pillcam in mid small bowel - Passage to colon not confirmed 5/2/24 ENTEROSCOPY - DR. HON Davon Duffy >5 mm esophageal varices. Three bands placed. No bleeding noted. - Gastric antral vascular ectasia with bleeding. Treated with APC - Normal examined duodenum - Normal portion of jejunum  - No specimens collected  5/6/2024 XR ABDOMEN 1 VIEW  \~ CLINICAL INDICATION: foreign body (PillCam done last week, did not pass to colon at end of study). \~ TECHNIQUE: XR ABDOMEN 1 VIEW [id number] \~ COMPARISON: Abdominal ultrasound from 3/29/2024. MRI abdomen from 12/6/2022 \~ Findings: Support Devices: Cardiac leads and tubing overlie the abdomen. \~ Abdomen: Small bowel loops measure up to 4.1 cm. Colonic bowel gas is noted. \~ Other: Right upper quadrant surgical clips. \~ IMPRESSION:  Impression: Small bowel loops measure up to 4.1 cm. Correlate clinically for ileus. No PillCam demonstrated. 5/14/2024 MRI ABDOMEN WITH AND WITHOUT CONTRAST AND MRCP - Hepatic Cirrhosis. Mild splenomegaly and mild to moderate upper abdominal ascites, suggesting portal hypertension - Amorphous area of signal abnormality involving right posterior liver, not specific. Recommend repeat 3 months to reevaluate - Nonspecific smal cystic lesions of the spleen, likely benign

## 2024-12-19 ENCOUNTER — LAB OUTSIDE AN ENCOUNTER (OUTPATIENT)
Dept: URBAN - METROPOLITAN AREA CLINIC 98 | Facility: CLINIC | Age: 69
End: 2024-12-19

## 2024-12-21 LAB
AFP, SERUM, TUMOR MARKER: 1.8
ALBUMIN: 3.2
ALKALINE PHOSPHATASE: 106
ALT (SGPT): 10
AST (SGOT): 24
BASO (ABSOLUTE): 0
BASOS: 1
BILIRUBIN, TOTAL: 0.4
BUN/CREATININE RATIO: 10
BUN: 9
CALCIUM: 8.3
CARBON DIOXIDE, TOTAL: 26
CHLORIDE: 110
CREATININE: 0.87
EGFR: 73
EOS (ABSOLUTE): 0.1
EOS: 3
FERRITIN, SERUM: 67
GLOBULIN, TOTAL: 4.1
GLUCOSE: 125
HEMATOCRIT: 28.2
HEMATOLOGY COMMENTS:: (no result)
HEMOGLOBIN: 9.2
IMMATURE CELLS: (no result)
IMMATURE GRANS (ABS): 0
IMMATURE GRANULOCYTES: 1
INR: 1.2
IRON BIND.CAP.(TIBC): 212
IRON SATURATION: 19
IRON: 40
LYMPHS (ABSOLUTE): 0.5
LYMPHS: 24
MCH: 29
MCHC: 32.6
MCV: 89
MONOCYTES(ABSOLUTE): 0.2
MONOCYTES: 11
NEUTROPHILS (ABSOLUTE): 1.3
NEUTROPHILS: 60
NRBC: (no result)
PLATELETS: 54
POTASSIUM: 3.7
PROTEIN, TOTAL: 7.3
PROTHROMBIN TIME: 13.1
RBC: 3.17
RDW: 15.7
SODIUM: 143
UIBC: 172
WBC: 2.1

## 2024-12-23 ENCOUNTER — TELEPHONE ENCOUNTER (OUTPATIENT)
Dept: URBAN - METROPOLITAN AREA CLINIC 98 | Facility: CLINIC | Age: 69
End: 2024-12-23

## 2024-12-23 ENCOUNTER — TELEPHONE ENCOUNTER (OUTPATIENT)
Dept: URBAN - METROPOLITAN AREA CLINIC 3 | Facility: CLINIC | Age: 69
End: 2024-12-23

## 2025-01-07 ENCOUNTER — TELEPHONE ENCOUNTER (OUTPATIENT)
Dept: URBAN - METROPOLITAN AREA CLINIC 23 | Facility: CLINIC | Age: 70
End: 2025-01-07

## 2025-01-24 ENCOUNTER — LAB OUTSIDE AN ENCOUNTER (OUTPATIENT)
Dept: URBAN - METROPOLITAN AREA CLINIC 98 | Facility: CLINIC | Age: 70
End: 2025-01-24

## 2025-01-24 ENCOUNTER — OFFICE VISIT (OUTPATIENT)
Dept: URBAN - METROPOLITAN AREA MEDICAL CENTER 28 | Facility: MEDICAL CENTER | Age: 70
End: 2025-01-24
Payer: COMMERCIAL

## 2025-01-24 DIAGNOSIS — I85.10 SECONDARY ESOPHAGEAL VARICES WITHOUT BLEEDING: ICD-10-CM

## 2025-01-24 DIAGNOSIS — K74.60 CIRRHOSIS: ICD-10-CM

## 2025-01-24 LAB
GLUCOSE POC: 88
PERFORMING LAB: (no result)

## 2025-01-24 PROCEDURE — 43244 EGD VARICES LIGATION: CPT | Performed by: INTERNAL MEDICINE

## 2025-02-19 ENCOUNTER — TELEPHONE ENCOUNTER (OUTPATIENT)
Dept: URBAN - METROPOLITAN AREA CLINIC 98 | Facility: CLINIC | Age: 70
End: 2025-02-19

## 2025-03-24 ENCOUNTER — LAB OUTSIDE AN ENCOUNTER (OUTPATIENT)
Dept: URBAN - METROPOLITAN AREA CLINIC 98 | Facility: CLINIC | Age: 70
End: 2025-03-24

## 2025-04-01 ENCOUNTER — LAB OUTSIDE AN ENCOUNTER (OUTPATIENT)
Dept: URBAN - METROPOLITAN AREA CLINIC 98 | Facility: CLINIC | Age: 70
End: 2025-04-01

## 2025-04-01 ENCOUNTER — OFFICE VISIT (OUTPATIENT)
Dept: URBAN - METROPOLITAN AREA CLINIC 98 | Facility: CLINIC | Age: 70
End: 2025-04-01
Payer: COMMERCIAL

## 2025-04-01 DIAGNOSIS — D50.9 IRON DEFICIENCY ANEMIA, UNSPECIFIED IRON DEFICIENCY ANEMIA TYPE: ICD-10-CM

## 2025-04-01 DIAGNOSIS — K29.51 CHRONIC GASTRITIS WITH BLEEDING, UNSPECIFIED GASTRITIS TYPE: ICD-10-CM

## 2025-04-01 DIAGNOSIS — I85.11 SECONDARY ESOPHAGEAL VARICES WITH BLEEDING: ICD-10-CM

## 2025-04-01 DIAGNOSIS — N81.4 UTERINE PROLAPSE: ICD-10-CM

## 2025-04-01 DIAGNOSIS — K74.69 OTHER CIRRHOSIS OF LIVER: ICD-10-CM

## 2025-04-01 PROCEDURE — 99214 OFFICE O/P EST MOD 30 MIN: CPT | Performed by: INTERNAL MEDICINE

## 2025-04-01 RX ORDER — RIFAXIMIN 550 MG/1
1 TABLET TABLET ORAL TWICE A DAY
Qty: 60 | Refills: 5 | Status: ON HOLD | COMMUNITY

## 2025-04-01 RX ORDER — PANTOPRAZOLE SODIUM 40 MG/1
1 TABLET TABLET, DELAYED RELEASE ORAL ONCE A DAY
Qty: 30 | Refills: 1 | Status: ON HOLD | COMMUNITY

## 2025-04-01 RX ORDER — LACTULOSE 10 G/15ML
15 ML AS NEEDED SOLUTION ORAL TWICE A DAY
Qty: 900 ML | Refills: 5 | Status: ON HOLD | COMMUNITY

## 2025-04-01 RX ORDER — ONDANSETRON 4 MG/1
1-2 TABLET ON THE TONGUE AND ALLOW TO DISSOLVE TABLET, ORALLY DISINTEGRATING ORAL
Qty: 60 | Refills: 1 | Status: ON HOLD | COMMUNITY

## 2025-04-01 NOTE — HPI-TODAY'S VISIT:
Here to follow up  has a prolapse uterus - large lump in vaginal area  - not painful but it mentally bothers her; also having issues with sexual function.  tried creams, etc  Dr Zbigniew Gonzalez is seeing her; considering surgery if other options are not effective.  no overt GI Bleeding  no abdominal distension  Following with Hematology . MRI 3/2025   IMPRESSION:        1.    Cirrhosis with stable areas of peripheral fibrosis in the liver. No MR evidence of hepatocellular carcinoma.        2.    Stigmata of portal hypertension including minimal ascites, decreased splenomegaly, and small upper abdominal varices. Previously seen findings of portal colopathy appear improved.

## 2025-04-02 LAB
AFP, SERUM, TUMOR MARKER: 1.8
ALBUMIN: 3.6
ALKALINE PHOSPHATASE: 64
ALT (SGPT): 19
AST (SGOT): 27
BASO (ABSOLUTE): 0
BASOS: 1
BILIRUBIN, TOTAL: 0.4
BUN/CREATININE RATIO: 16
BUN: 17
CALCIUM: 9.1
CARBON DIOXIDE, TOTAL: 21
CHLORIDE: 116
CREATININE: 1.07
EGFR: 56
EOS (ABSOLUTE): 0.1
EOS: 3
FERRITIN, SERUM: 33
GLOBULIN, TOTAL: 4.3
GLUCOSE: 95
HEMATOCRIT: 28.5
HEMATOLOGY COMMENTS:: (no result)
HEMOGLOBIN: 8.9
IMMATURE CELLS: (no result)
IMMATURE GRANS (ABS): 0
IMMATURE GRANULOCYTES: 1
INR: 1.2
IRON BIND.CAP.(TIBC): 405
IRON SATURATION: 13
IRON: 51
LYMPHS (ABSOLUTE): 0.6
LYMPHS: 30
MCH: 25.8
MCHC: 31.2
MCV: 83
MONOCYTES(ABSOLUTE): 0.2
MONOCYTES: 11
NEUTROPHILS (ABSOLUTE): 1.2
NEUTROPHILS: 54
NRBC: (no result)
PLATELETS: 50
POTASSIUM: 4.1
PROTEIN, TOTAL: 7.9
PROTHROMBIN TIME: 12.9
RBC: 3.45
RDW: 17.1
SODIUM: 145
UIBC: 354
WBC: 2.1

## 2025-04-03 ENCOUNTER — TELEPHONE ENCOUNTER (OUTPATIENT)
Dept: URBAN - METROPOLITAN AREA CLINIC 98 | Facility: CLINIC | Age: 70
End: 2025-04-03

## 2025-04-03 PROBLEM — 24976005: Status: ACTIVE | Noted: 2025-04-03

## 2025-06-17 ENCOUNTER — OFFICE VISIT (OUTPATIENT)
Dept: URBAN - METROPOLITAN AREA MEDICAL CENTER 39 | Facility: MEDICAL CENTER | Age: 70
End: 2025-06-17
Payer: COMMERCIAL

## 2025-06-17 DIAGNOSIS — I85.10 ESOPH VARICE OTHER DIS: ICD-10-CM

## 2025-06-17 DIAGNOSIS — I86.4 BLEEDING GASTRIC VARICES: ICD-10-CM

## 2025-06-17 DIAGNOSIS — K74.60 ADVANCED CIRRHOSIS: ICD-10-CM

## 2025-06-17 PROCEDURE — 43244 EGD VARICES LIGATION: CPT | Performed by: INTERNAL MEDICINE

## 2025-06-17 RX ORDER — PANTOPRAZOLE SODIUM 40 MG/1
1 TABLET TABLET, DELAYED RELEASE ORAL ONCE A DAY
Qty: 30 | Refills: 1 | Status: ON HOLD | COMMUNITY

## 2025-06-17 RX ORDER — ONDANSETRON 4 MG/1
1-2 TABLET ON THE TONGUE AND ALLOW TO DISSOLVE TABLET, ORALLY DISINTEGRATING ORAL
Qty: 60 | Refills: 1 | Status: ON HOLD | COMMUNITY

## 2025-06-17 RX ORDER — RIFAXIMIN 550 MG/1
1 TABLET TABLET ORAL TWICE A DAY
Qty: 60 | Refills: 5 | Status: ON HOLD | COMMUNITY

## 2025-06-17 RX ORDER — LACTULOSE 10 G/15ML
15 ML AS NEEDED SOLUTION ORAL TWICE A DAY
Qty: 900 ML | Refills: 5 | Status: ON HOLD | COMMUNITY

## 2025-06-24 ENCOUNTER — TELEPHONE ENCOUNTER (OUTPATIENT)
Dept: URBAN - METROPOLITAN AREA CLINIC 98 | Facility: CLINIC | Age: 70
End: 2025-06-24

## 2025-06-30 ENCOUNTER — TELEPHONE ENCOUNTER (OUTPATIENT)
Dept: URBAN - METROPOLITAN AREA CLINIC 98 | Facility: CLINIC | Age: 70
End: 2025-06-30

## 2025-07-01 ENCOUNTER — LAB OUTSIDE AN ENCOUNTER (OUTPATIENT)
Dept: URBAN - METROPOLITAN AREA CLINIC 98 | Facility: CLINIC | Age: 70
End: 2025-07-01

## 2025-07-15 ENCOUNTER — OFFICE VISIT (OUTPATIENT)
Dept: URBAN - METROPOLITAN AREA CLINIC 98 | Facility: CLINIC | Age: 70
End: 2025-07-15

## 2025-07-16 ENCOUNTER — OFFICE VISIT (OUTPATIENT)
Dept: URBAN - METROPOLITAN AREA CLINIC 98 | Facility: CLINIC | Age: 70
End: 2025-07-16

## 2025-07-23 ENCOUNTER — TELEPHONE ENCOUNTER (OUTPATIENT)
Dept: URBAN - METROPOLITAN AREA CLINIC 98 | Facility: CLINIC | Age: 70
End: 2025-07-23

## 2025-07-28 ENCOUNTER — OFFICE VISIT (OUTPATIENT)
Dept: URBAN - METROPOLITAN AREA CLINIC 98 | Facility: CLINIC | Age: 70
End: 2025-07-28

## 2025-07-28 RX ORDER — LACTULOSE 10 G/15ML
15 ML AS NEEDED SOLUTION ORAL TWICE A DAY
Qty: 900 ML | Refills: 5 | Status: ON HOLD | COMMUNITY

## 2025-07-28 RX ORDER — PANTOPRAZOLE SODIUM 40 MG/1
1 TABLET TABLET, DELAYED RELEASE ORAL ONCE A DAY
Qty: 30 | Refills: 1 | Status: ON HOLD | COMMUNITY

## 2025-07-28 RX ORDER — ONDANSETRON 4 MG/1
1-2 TABLET ON THE TONGUE AND ALLOW TO DISSOLVE TABLET, ORALLY DISINTEGRATING ORAL
Qty: 60 | Refills: 1 | Status: ON HOLD | COMMUNITY

## 2025-07-28 RX ORDER — RIFAXIMIN 550 MG/1
1 TABLET TABLET ORAL TWICE A DAY
Qty: 60 | Refills: 5 | Status: ON HOLD | COMMUNITY

## 2025-07-28 NOTE — HPI-OTHER HISTORIES
4/1/2025 Dr. Ocampo Here to follow up has a prolapse uterus - large lump in vaginal area - not painful but it mentally bothers her; also having issues with sexual function. tried creams, etc Dr Zbigniew Gonzalez is seeing her; considering surgery if other options are not effective. no overt GI Bleeding no abdominal distension Following with Hematology . MRI 3/2025 IMPRESSION: 1. Cirrhosis with stable areas of peripheral fibrosis in the liver. No MR evidence of hepatocellular carcinoma. 2. Stigmata of portal hypertension including minimal ascites, decreased splenomegaly, and small upper abdominal varices. Previously seen findings of portal colopathy appear improved.

## 2025-07-28 NOTE — HPI-TODAY'S VISIT:
7/28/2025 Verona Espinoza, JALEEL -70-year-old female here to follow-up anemia, fatigue - Patient following up with hematologist managing iron and blood transfusions as needed- [place]  Labs 7/9/2025 Hemoglobin 8.5, hematocrit 26.1, MCV 80.6, ferritin 15.9, iron sat 15  Went to the ER [facility] emergency in [place] 7/18/2025 with fatigue and generalized weakness, pancytopenia dehydration. Labs 7/12/2025 Sodium 140, creatinine 1.17, Albumin 3.2, total bili 0.5, alk phos 53, AST 30, ALT 15, INR 1.4 WBC 1.7, RBC 3.01, hemoglobin 8.3, hematocrit 24.5, platelets 77  Now - Iron infusions- due 8/1/2025 - Severe fatigue and weakness - dark stools every couple of months - Scheduled for bone biopsy 8/6/2025  Reviewed  MRI 3/2025 IMPRESSION: 1. Cirrhosis with stable areas of peripheral fibrosis in the liver. No MR evidence of hepatocellular carcinoma. 2. Stigmata of portal hypertension including minimal ascites, decreased splenomegaly, and small upper abdominal varices. Previously seen findings of portal colopathy appear improved. 6/17/2025 EGD ([facility]):Large greater than 5 mm varices in the lower esophagus red prabhu signs were present.  1 band placed successfully.  No significant abdomen other abnormalities in the stomach.  Duodenum appeared normal.  Mild gastric antral vascular ectasia was present in the gastric antrum.  Type II gastroesophageal varices with no bleeding were found in the gastric fundus.  No specimens collected.  Recommend repeat surveillance EGD in 6 months.

## 2025-08-19 ENCOUNTER — OFFICE VISIT (OUTPATIENT)
Dept: URBAN - METROPOLITAN AREA MEDICAL CENTER 39 | Facility: MEDICAL CENTER | Age: 70
End: 2025-08-19

## 2025-08-19 RX ORDER — PANTOPRAZOLE SODIUM 40 MG/1
1 TABLET TABLET, DELAYED RELEASE ORAL ONCE A DAY
Qty: 30 | Refills: 1 | Status: ON HOLD | COMMUNITY

## 2025-08-19 RX ORDER — ONDANSETRON 4 MG/1
1 TABLET ON THE TONGUE AND ALLOW TO DISSOLVE TABLET, ORALLY DISINTEGRATING ORAL
Qty: 30 | Refills: 1 | OUTPATIENT
Start: 2025-08-19

## 2025-08-19 RX ORDER — RIFAXIMIN 550 MG/1
1 TABLET TABLET ORAL TWICE A DAY
Qty: 60 | Refills: 5 | Status: ON HOLD | COMMUNITY

## 2025-08-19 RX ORDER — ONDANSETRON 4 MG/1
1-2 TABLET ON THE TONGUE AND ALLOW TO DISSOLVE TABLET, ORALLY DISINTEGRATING ORAL
Qty: 60 | Refills: 1 | Status: ON HOLD | COMMUNITY

## 2025-08-19 RX ORDER — LACTULOSE 10 G/15ML
15 ML AS NEEDED SOLUTION ORAL TWICE A DAY
Qty: 900 ML | Refills: 5 | Status: ON HOLD | COMMUNITY

## 2025-08-27 ENCOUNTER — LAB OUTSIDE AN ENCOUNTER (OUTPATIENT)
Dept: URBAN - METROPOLITAN AREA CLINIC 98 | Facility: CLINIC | Age: 70
End: 2025-08-27